# Patient Record
Sex: FEMALE | Race: WHITE
[De-identification: names, ages, dates, MRNs, and addresses within clinical notes are randomized per-mention and may not be internally consistent; named-entity substitution may affect disease eponyms.]

---

## 2022-11-10 ENCOUNTER — HOSPITAL ENCOUNTER (OUTPATIENT)
Dept: HOSPITAL 47 - RADMAMWWP | Age: 37
Discharge: HOME | End: 2022-11-10
Attending: OBSTETRICS & GYNECOLOGY
Payer: COMMERCIAL

## 2022-11-10 DIAGNOSIS — Z80.3: ICD-10-CM

## 2022-11-10 DIAGNOSIS — Z12.31: Primary | ICD-10-CM

## 2022-11-10 PROCEDURE — 77067 SCR MAMMO BI INCL CAD: CPT

## 2022-11-10 PROCEDURE — 77063 BREAST TOMOSYNTHESIS BI: CPT

## 2022-11-10 NOTE — MM
Reason for Exam: Screening  (asymptomatic). 





Patient History: 

Menarche at age 15. Patient has no children.

Paternal aunt had breast cancer, age 50. 





Risk Values: 

Janell 5 year model risk: 0.5%.

NCI Lifetime model risk: 10.2%.





Tissue Density: 

There are scattered fibroglandular densities.





Findings: 

Analyzed By CAD. 

Pattern appears symmetrical. There are some scattered regional punctate small round calcifications

inferior left breast.

No suspicious groups of microcalcifications, spiculated or lobular masses, architectural distortion

or other secondary signs of malignancy are mammographically apparent. 





Overall Assessment: Benign, BI-RAD 2





Management: 

Screening Mammogram of both breasts in 1 year.

A negative mammogram report should not preclude additional follow up of suspicious palpable

abnormalities.

Patient should continue monthly self breast exam.

A clinical breast exam by your physician is recommended on an annual basis and results should be

correlated with mammographic findings.



Electronically signed and approved by: Gustavo Sorenson D.O. Radiologis

## 2023-01-04 ENCOUNTER — HOSPITAL ENCOUNTER (OUTPATIENT)
Dept: HOSPITAL 47 - LABWHC1 | Age: 38
Discharge: HOME | End: 2023-01-04
Payer: COMMERCIAL

## 2023-01-04 DIAGNOSIS — T78.2XXA: Primary | ICD-10-CM

## 2023-01-04 PROCEDURE — 36415 COLL VENOUS BLD VENIPUNCTURE: CPT

## 2023-05-01 ENCOUNTER — HOSPITAL ENCOUNTER (OUTPATIENT)
Dept: HOSPITAL 47 - RADCTMAIN | Age: 38
Discharge: HOME | End: 2023-05-01
Attending: OPHTHALMOLOGY
Payer: COMMERCIAL

## 2023-05-01 DIAGNOSIS — H53.462: Primary | ICD-10-CM

## 2023-05-01 PROCEDURE — 70470 CT HEAD/BRAIN W/O & W/DYE: CPT

## 2023-05-01 NOTE — CT
EXAMINATION TYPE: CT brain wo/w con

CT DLP: 2108.40 mGycm, Automated exposure control for dose reduction was used.

 

DATE OF EXAM: 5/1/2023 1:41 PM

 

COMPARISON: None.  

 

CLINICAL INDICATION:Female, 37 years old with history of H53.462; PHH, Homonymous bilateral field def
ects, left side.  Headaches

 

TECHNIQUE: Axial CT images of the brain were obtained followed by contrast enhanced axial images of t
he brain with 100 cc of ISO-view 370 IV contrast. One or more CT dose reduction strategies were utili
zed during this examination. 

FINDINGS:

Extra-axial spaces: No abnormal extra-axial fluid collections.

Ventricular system: Within normal limits

Cerebral parenchyma: No acute intraparenchymal hemorrhage or mass effect.  The gray-white junction is
 well differentiated. No abnormal enhancement is seen after the administration of intravenous contras
t. Suprasellar cistern appears unremarkable.

Cerebellum: Unremarkable.

Mass effect: No evidence of midline shift.

Intracranial vasculature: unremarkable

Soft tissues: Normal.

Calvarium/osseous structures: No depressed skull fracture.

Paranasal sinuses and mastoid air cells: Clear.

Visualized orbits: Orbital contents are intact.

 

IMPRESSION:

No acute intracranial process and no evidence to suggest intracranial mass. Consider further evaluati
on with MRI brain as clinically indicated.